# Patient Record
Sex: FEMALE | URBAN - METROPOLITAN AREA
[De-identification: names, ages, dates, MRNs, and addresses within clinical notes are randomized per-mention and may not be internally consistent; named-entity substitution may affect disease eponyms.]

---
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
## 2023-06-13 NOTE — ED PROVIDER NOTE - CONDITION AT DISCHARGE:
"Hub staff attempted to follow warm transfer process and was unsuccessful     Caller: Shirin Washington \"Rosi\"    Relationship to patient: Self    Best call back number: [phone]    Patient is needing: PT RETURNED CALL TO JONATHAN TO MOVE TO AN EARLIER APPT. HUB UNABLE TO SCHEDULE.    PLEASE TRY TO REACH PT AGAIN.  "
Update to Melba via bubble.   
Satisfactory